# Patient Record
Sex: FEMALE | Race: WHITE | NOT HISPANIC OR LATINO | URBAN - METROPOLITAN AREA
[De-identification: names, ages, dates, MRNs, and addresses within clinical notes are randomized per-mention and may not be internally consistent; named-entity substitution may affect disease eponyms.]

---

## 2019-10-21 ENCOUNTER — WALK IN (OUTPATIENT)
Dept: URGENT CARE | Age: 24
End: 2019-10-21

## 2019-10-21 DIAGNOSIS — Z23 ENCOUNTER FOR IMMUNIZATION: Primary | ICD-10-CM

## 2019-10-21 PROCEDURE — 90471 IMMUNIZATION ADMIN: CPT | Performed by: NURSE PRACTITIONER

## 2019-10-21 PROCEDURE — 90686 IIV4 VACC NO PRSV 0.5 ML IM: CPT | Performed by: NURSE PRACTITIONER

## 2021-08-21 ENCOUNTER — TELEPHONE (OUTPATIENT)
Dept: INTERNAL MEDICINE CLINIC | Facility: HOSPITAL | Age: 26
End: 2021-08-21

## 2021-08-21 DIAGNOSIS — Z20.822 CLOSE EXPOSURE TO COVID-19 VIRUS: Primary | ICD-10-CM

## 2021-08-21 NOTE — TELEPHONE ENCOUNTER
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    Dept Manager/Supervisor/team or clinical lead: Marylee Sea    Position:  [] MD     [] RN     [] Respiratory Therapist     [] PCT     [] Other RN resident    HAVE next week. When was the last shift you worked?  8/20/21  When are you next scheduled to work? 8.22.21- night    Did you have close contact with someone on your unit while not wearing a mask? (e.g., during meal breaks):  Yes [x]   No []    If yes, who:   Do work and continue to monitor symptoms for the                                       next 14 days. Test w/ [facility] 3-5 days after exposure.                                                  COVID-19 testing ordered: [] Rapid    [x] [facility]    Date test is to

## 2021-08-22 ENCOUNTER — NURSE ONLY (OUTPATIENT)
Dept: LAB | Facility: HOSPITAL | Age: 26
End: 2021-08-22
Attending: PREVENTIVE MEDICINE

## 2021-08-22 DIAGNOSIS — Z20.822 CLOSE EXPOSURE TO COVID-19 VIRUS: ICD-10-CM

## 2021-08-25 LAB — SARS-COV-2 RNA RESP QL NAA+PROBE: NOT DETECTED

## 2021-08-25 NOTE — TELEPHONE ENCOUNTER
Results and RTW guidelines:    COVID RESULT:    [x] Viewed by employee in [facility]. RTW plan and instructions as indicated on triage call. Manager notified.   Estimated RTW date: continues to work  [] Discussed with employee   [] Unable to reach by phone

## 2022-11-07 ENCOUNTER — IMMUNIZATION (OUTPATIENT)
Dept: LAB | Facility: HOSPITAL | Age: 27
End: 2022-11-07
Attending: PREVENTIVE MEDICINE

## 2022-11-07 DIAGNOSIS — Z23 NEED FOR VACCINATION: Primary | ICD-10-CM

## 2022-11-07 PROCEDURE — 90471 IMMUNIZATION ADMIN: CPT

## 2022-12-05 ENCOUNTER — TELEPHONE (OUTPATIENT)
Dept: INTERNAL MEDICINE CLINIC | Facility: HOSPITAL | Age: 27
End: 2022-12-05

## 2022-12-05 ENCOUNTER — LAB ENCOUNTER (OUTPATIENT)
Dept: LAB | Age: 27
End: 2022-12-05
Attending: PREVENTIVE MEDICINE

## 2022-12-05 DIAGNOSIS — Z20.822 SUSPECTED COVID-19 VIRUS INFECTION: ICD-10-CM

## 2022-12-05 DIAGNOSIS — Z20.822 SUSPECTED COVID-19 VIRUS INFECTION: Primary | ICD-10-CM

## 2022-12-05 LAB — SARS-COV-2 RNA RESP QL NAA+PROBE: DETECTED

## 2022-12-06 NOTE — TELEPHONE ENCOUNTER
Results and RTW guidelines:    COVID RESULT:    [x] Viewed by employee in [address] RTW plan and instructions as indicated on triage call. Manager notified. Estimated RTW date: 12/9  [x] Discussed with employee   [] Unable to reach by phone. Sent via [employer] message      Test type:    [x] Rapid         [] [facility]         [] Outside test:       [x] Positive     - Employee should quarantine at home for at least 5 days (day 1 is day after sx onset) , follow the CDC guidelines for cleaning and                              quarantining; see CDC.gov   -This employee may RTW on day 6 if asymptomatic or mildly symptomatic (with improving symptoms). Call Employee Health on day 5 if unable to return on day 6 after                      symptom onset.    -This employee needs to call Employee Health on day 5 after symptom onset. The employee needs to be cleared by Employee Health. - Monitor symptoms and temperature                 - Notify PCP of result                 - Seek emergent care with worsening symptoms   - If employee is still experiencing severe symptoms on day 5 must make a RTW appt with Employee [facility], Employee will not be cleared if:    1. Has consistent cough, shortness of breath or fatigue that restricts your physical activities    2. Is still feeling \"unwell\"    3. Within 15 days of hospitalization for COVID    4. Within 20 days of intubation for COVID    5.  Still has a fever, vomiting or diarrhea   - Keep communication open with management about RTW and if symptoms worsen                - If outside testing completed, bring a copy of result to RTW appointment           Notes:     RTW PLAN:    [x]  If COVID positive results, off work minimum of 5 days from positive test or onset of symptoms (day 0)        On day 5, if asymptomatic or mildly symptomatic (with improving symptoms) may return to work day 6          On day 5, if symptomatic, call Employee Health for RTW screening        []  COVID positive result - call Employee Health on day 5 after symptom onset. The employee needs to be cleared by Employee Health to RTW. [] RTW immediately, continue to monitor for sx  [] RTW when sx improve; must be fever free for 24 hours w/o medications, Diarrhea/Vomiting free for 24 hours w/o medications  [] [facility] ordered; continue to monitor sx and call for new/worsening sx.   Discuss RTW guidelines with manager  [] May continue to work  [] Follow up with PCP  [] Home until further instruction from hotline with [facility] results  INSTRUCTIONS PROVIDED:  [x]  Plan as noted above  []  Length of time to obtain results   [x]  Quarantine instructions  [x]  Masking protocol   [x]  S/S of worsening infection/condition and importance of prompt medical re-evaluation including when to seek emergency care  [] If symptoms develop, stay home and call hotline for rapid test order    Estimated RTW date:  12/9    [x] The employee voiced understanding of above plan/instructions  [x] Manager Notified

## 2023-05-16 ENCOUNTER — OFFICE VISIT (OUTPATIENT)
Dept: OBGYN CLINIC | Facility: CLINIC | Age: 28
End: 2023-05-16

## 2023-05-16 VITALS — HEIGHT: 65.75 IN | WEIGHT: 146.25 LBS | BODY MASS INDEX: 23.79 KG/M2

## 2023-05-16 DIAGNOSIS — Z11.3 SCREEN FOR STD (SEXUALLY TRANSMITTED DISEASE): ICD-10-CM

## 2023-05-16 DIAGNOSIS — Z01.419 ENCOUNTER FOR WELL WOMAN EXAM WITH ROUTINE GYNECOLOGICAL EXAM: Primary | ICD-10-CM

## 2023-05-16 PROCEDURE — 99385 PREV VISIT NEW AGE 18-39: CPT | Performed by: OBSTETRICS & GYNECOLOGY

## 2023-05-16 PROCEDURE — 3008F BODY MASS INDEX DOCD: CPT | Performed by: OBSTETRICS & GYNECOLOGY

## 2023-05-16 RX ORDER — DROSPIRENONE AND ETHINYL ESTRADIOL 0.02-3(28)
1 KIT ORAL DAILY
COMMUNITY
Start: 2023-01-17 | End: 2023-05-16

## 2023-05-16 RX ORDER — DROSPIRENONE AND ETHINYL ESTRADIOL 0.02-3(28)
1 KIT ORAL DAILY
Qty: 84 TABLET | Refills: 4 | Status: SHIPPED | OUTPATIENT
Start: 2023-05-16

## 2023-05-17 LAB
C TRACH DNA SPEC QL NAA+PROBE: NEGATIVE
N GONORRHOEA DNA SPEC QL NAA+PROBE: NEGATIVE

## 2023-05-22 LAB — HPV I/H RISK 1 DNA SPEC QL NAA+PROBE: NEGATIVE

## 2023-07-04 ENCOUNTER — OFFICE VISIT (OUTPATIENT)
Dept: FAMILY MEDICINE CLINIC | Facility: CLINIC | Age: 28
End: 2023-07-04
Payer: COMMERCIAL

## 2023-07-04 VITALS
BODY MASS INDEX: 21.25 KG/M2 | OXYGEN SATURATION: 97 % | HEART RATE: 87 BPM | TEMPERATURE: 98 F | WEIGHT: 140.19 LBS | SYSTOLIC BLOOD PRESSURE: 120 MMHG | RESPIRATION RATE: 20 BRPM | DIASTOLIC BLOOD PRESSURE: 70 MMHG | HEIGHT: 68 IN

## 2023-07-04 DIAGNOSIS — R30.0 DYSURIA: Primary | ICD-10-CM

## 2023-07-04 PROCEDURE — 3074F SYST BP LT 130 MM HG: CPT | Performed by: NURSE PRACTITIONER

## 2023-07-04 PROCEDURE — 87086 URINE CULTURE/COLONY COUNT: CPT | Performed by: NURSE PRACTITIONER

## 2023-07-04 PROCEDURE — 3008F BODY MASS INDEX DOCD: CPT | Performed by: NURSE PRACTITIONER

## 2023-07-04 PROCEDURE — 87088 URINE BACTERIA CULTURE: CPT | Performed by: NURSE PRACTITIONER

## 2023-07-04 PROCEDURE — 87186 SC STD MICRODIL/AGAR DIL: CPT | Performed by: NURSE PRACTITIONER

## 2023-07-04 PROCEDURE — 3078F DIAST BP <80 MM HG: CPT | Performed by: NURSE PRACTITIONER

## 2023-07-04 PROCEDURE — 99213 OFFICE O/P EST LOW 20 MIN: CPT | Performed by: NURSE PRACTITIONER

## 2023-07-04 RX ORDER — NITROFURANTOIN 25; 75 MG/1; MG/1
100 CAPSULE ORAL 2 TIMES DAILY
Qty: 10 CAPSULE | Refills: 0 | Status: SHIPPED | OUTPATIENT
Start: 2023-07-04 | End: 2023-07-09

## 2023-10-11 ENCOUNTER — OFFICE VISIT (OUTPATIENT)
Dept: FAMILY MEDICINE CLINIC | Facility: CLINIC | Age: 28
End: 2023-10-11
Payer: COMMERCIAL

## 2023-10-11 VITALS
BODY MASS INDEX: 21.22 KG/M2 | HEART RATE: 79 BPM | DIASTOLIC BLOOD PRESSURE: 70 MMHG | RESPIRATION RATE: 16 BRPM | TEMPERATURE: 98 F | WEIGHT: 140 LBS | OXYGEN SATURATION: 97 % | HEIGHT: 68 IN | SYSTOLIC BLOOD PRESSURE: 102 MMHG

## 2023-10-11 DIAGNOSIS — R39.9 URINARY SYMPTOM OR SIGN: Primary | ICD-10-CM

## 2023-10-11 PROCEDURE — 3074F SYST BP LT 130 MM HG: CPT | Performed by: NURSE PRACTITIONER

## 2023-10-11 PROCEDURE — 3008F BODY MASS INDEX DOCD: CPT | Performed by: NURSE PRACTITIONER

## 2023-10-11 PROCEDURE — 99213 OFFICE O/P EST LOW 20 MIN: CPT | Performed by: NURSE PRACTITIONER

## 2023-10-11 PROCEDURE — 87086 URINE CULTURE/COLONY COUNT: CPT | Performed by: NURSE PRACTITIONER

## 2023-10-11 PROCEDURE — 3078F DIAST BP <80 MM HG: CPT | Performed by: NURSE PRACTITIONER

## 2023-10-11 RX ORDER — NITROFURANTOIN 25; 75 MG/1; MG/1
CAPSULE ORAL
Qty: 14 CAPSULE | Refills: 0 | Status: SHIPPED | OUTPATIENT
Start: 2023-10-11

## 2024-01-10 ENCOUNTER — OFFICE VISIT (OUTPATIENT)
Dept: FAMILY MEDICINE CLINIC | Facility: CLINIC | Age: 29
End: 2024-01-10
Payer: COMMERCIAL

## 2024-01-10 VITALS
WEIGHT: 140 LBS | OXYGEN SATURATION: 97 % | DIASTOLIC BLOOD PRESSURE: 62 MMHG | SYSTOLIC BLOOD PRESSURE: 100 MMHG | TEMPERATURE: 98 F | BODY MASS INDEX: 21.22 KG/M2 | HEART RATE: 90 BPM | RESPIRATION RATE: 16 BRPM | HEIGHT: 68 IN

## 2024-01-10 DIAGNOSIS — H69.93 ACUTE DYSFUNCTION OF EUSTACHIAN TUBE, BILATERAL: Primary | ICD-10-CM

## 2024-01-10 PROCEDURE — 3008F BODY MASS INDEX DOCD: CPT | Performed by: NURSE PRACTITIONER

## 2024-01-10 PROCEDURE — 3074F SYST BP LT 130 MM HG: CPT | Performed by: NURSE PRACTITIONER

## 2024-01-10 PROCEDURE — 99213 OFFICE O/P EST LOW 20 MIN: CPT | Performed by: NURSE PRACTITIONER

## 2024-01-10 PROCEDURE — 3078F DIAST BP <80 MM HG: CPT | Performed by: NURSE PRACTITIONER

## 2024-01-10 RX ORDER — METHYLPREDNISOLONE 4 MG/1
TABLET ORAL
Qty: 1 EACH | Refills: 0 | Status: SHIPPED | OUTPATIENT
Start: 2024-01-10

## 2024-01-10 NOTE — PROGRESS NOTES
CHIEF COMPLAINT:     Chief Complaint   Patient presents with    Ear Pain     R ear pain, similar sx to L ear   Sx onset 12/28          HPI:   Doris Moore is a 28 year old female who presents to clinic today with complaints of bilateral (R>L) ear pain. Has had symptoms waxing and waning since 12/28/23.  Was diagnosed with sinus infection by a different urgent care at that time.  Was prescribed doxy, flonase, antihistamine, sinus rinses.  The sinus symptoms improved with the meds but ear symptoms persistent.  C/o bilateral dull ear ache, feels feel plugged and mildly decreased hearing, R>L.  Symptoms worse at night.  Patient denies history of ear infections.  Nothing really makes ear pain better.  Home treatments include sinus rinses, mucinex day/night right now without relief.     Associated symptoms:  Patient denies ear drainage. Patient reports some persistent post nasal drainage.  Pt denies fever or dizziness.    Current Outpatient Medications   Medication Sig Dispense Refill           Drospirenone-Ethinyl Estradiol 3-0.02 MG Oral Tab Take 1 tablet by mouth daily. 84 tablet 4      Past Medical History:   Diagnosis Date    Osteomyelitis (HCC) 2005    IV antiboiotics      Social History:  Social History     Socioeconomic History    Marital status: Single   Tobacco Use    Smoking status: Never   Substance and Sexual Activity    Alcohol use: No    Drug use: No        REVIEW OF SYSTEMS:   GENERAL: Feeling well otherwise.    SKIN: no unusual skin lesions or rashes  HEENT: See HPI  LUNGS: No cough, shortness of breath, or wheezing.  CARDIOVASCULAR: No chest pain, palpitations  GI: No N/V/C/D.  NEURO: denies headaches or dizziness    EXAM:   /62   Pulse 90   Temp 98.3 °F (36.8 °C)   Resp 16   Ht 5' 8\" (1.727 m)   Wt 140 lb (63.5 kg)   LMP 09/25/2023 (Exact Date)   SpO2 97%   BMI 21.29 kg/m²   GENERAL: well developed, well nourished, and in no apparent distress  SKIN: no rashes, no suspicious  lesions  HEAD: atraumatic, normocephalic  EYES: conjunctiva clear, EOM intact  EARS: Tragus non tender on palpation bilaterally. External auditory canals healthy. Right TM: +Dull but non-erythematous, + bulging, no retraction,+cloudy effusion, bony landmarks mostly visible.  Left TM: +Dull but non-erythematous, +mild bulging, no retraction,+small effusion, bony landmarks visible.  NOSE: nostrils patent, no exudates, nasal mucosa pink and noninflamed  THROAT: oral mucosa pink, moist. +Posterior pharynx is not erythematous, +PND. No exudates.  NECK: supple, non-tender  LUNGS: clear to auscultation bilaterally, no wheezes or rhonchi. Breathing is non labored. No cough.  CARDIO: RRR without murmur  LYMPH: No lymphadenopathy.      ASSESSMENT AND PLAN:   Doris Moore is a 28 year old female who presents with:    ASSESSMENT:  Encounter Diagnosis   Name Primary?    Acute dysfunction of Eustachian tube, bilateral Yes       PLAN:   - Trial of consistent Flonase, Sudafed.  - If still no improvement, add Medrol pack as below.  - Comfort measures as described in Patient Instructions including tylenol/motrin prn pain.  - Elevate HOB at night.  - Advised F/U visit if no improvement/worsening/new symptoms such as fever or ear drainage within 3-5 days.  - Patient verbalizes understanding and is agreeable w/ plan.    Meds & Refills for this Visit:  Requested Prescriptions     Signed Prescriptions Disp Refills    methylPREDNISolone (MEDROL) 4 MG Oral Tablet Therapy Pack 1 each 0     Sig: As directed.         Risk and benefits of medication discussed. Pt verbalizes understanding.    There are no Patient Instructions on file for this visit.

## 2024-01-24 ENCOUNTER — HOSPITAL ENCOUNTER (OUTPATIENT)
Age: 29
Discharge: HOME | End: 2024-01-24
Payer: COMMERCIAL

## 2024-01-24 VITALS
HEART RATE: 100 BPM | RESPIRATION RATE: 18 BRPM | DIASTOLIC BLOOD PRESSURE: 84 MMHG | TEMPERATURE: 98 F | SYSTOLIC BLOOD PRESSURE: 130 MMHG | OXYGEN SATURATION: 99 %

## 2024-01-24 DIAGNOSIS — H92.01 RIGHT EAR PAIN: Primary | ICD-10-CM

## 2024-01-24 DIAGNOSIS — H69.91 DYSFUNCTION OF RIGHT EUSTACHIAN TUBE: ICD-10-CM

## 2024-01-24 PROCEDURE — 99213 OFFICE O/P EST LOW 20 MIN: CPT | Performed by: NURSE PRACTITIONER

## 2024-01-24 NOTE — ED PROVIDER NOTES
Patient Seen in: [facility]      History     Chief Complaint   Patient presents with    Ear Problem     Right ear pain that has been ongoing for 1 month - Entered by patient     Stated Complaint: Ear Problem - Right ear pain that has been ongoing for 1 month    Subjective:   HPI    28-year-old female presents to immediate care with complaints of right ear pain.  Patient states this has been going on for about 1 month.   During that time she was prescribed a course of antibiotics which she completed.  She was then prescribed a course of prednisone which she completed.  She reports brief window of relief but she is back today with increased pain in her right ear.    Objective:   Past Medical History:   Diagnosis Date    Osteomyelitis (HCC) 2005    IV antiboiotics              History reviewed. No pertinent surgical history.             Social History     Socioeconomic History    Marital status: Single   Tobacco Use    Smoking status: Never   Substance and Sexual Activity    Alcohol use: No    Drug use: No              Review of Systems    Positive for stated complaint: Ear Problem - Right ear pain that has been ongoing for 1 month  Other systems are as noted in HPI.  Constitutional and vital signs reviewed.      All other systems reviewed and negative except as noted above.    Physical Exam     ED Triage Vitals [01/24/24 1444]   /84   Pulse 100   Resp 18   Temp 98.2 °F (36.8 °C)   Temp src Temporal   SpO2 99 %   O2 Device None (Room air)       Current:/84   Pulse 100   Temp 98.2 °F (36.8 °C) (Temporal)   Resp 18   LMP 01/16/2024 (Exact Date)   SpO2 99%         Physical Exam  Vitals reviewed.   Constitutional:       General: She is not in acute distress.     Appearance: Normal appearance. She is not ill-appearing.   HENT:      Left Ear: Tympanic membrane, ear canal and external ear normal.      Ears:      Comments: + serous fluid behind R TM     Nose: Nose normal.      Mouth/Throat:       Mouth: Mucous membranes are moist.   Cardiovascular:      Rate and Rhythm: Regular rhythm. Tachycardia present.   Pulmonary:      Effort: Pulmonary effort is normal.      Breath sounds: Normal breath sounds.   Musculoskeletal:         General: Normal range of motion.      Cervical back: Normal range of motion and neck supple.   Lymphadenopathy:      Cervical: No cervical adenopathy.   Skin:     General: Skin is warm and dry.   Neurological:      General: No focal deficit present.      Mental Status: She is alert and oriented to person, place, and time.   Psychiatric:         Mood and Affect: Mood normal.               ED Course   Labs Reviewed - No data to display                   MDM                                         Medical Decision Making  28-year-old female presents to immediate care with complaints of right ear pain.  Patient states this has been going on for about 1 month.   During that time she was prescribed a course of antibiotics which she completed.  She was then prescribed a course of prednisone which she completed.  She reports brief window of relief but she is back today with increased pain in her right ear.  Patient denies fever.  Differential diagnosis includes otitis media, otitis externa, eustachian tube dysfunction.  On exam there is serous fluid behind the right TM.  There is no erythema.  Left ear is unremarkable.  Patient has taken a course of antibiotics as well as a course of prednisone.  At this point I would not retreat with those medications.  She was given the name of an ENT doctor to follow-up with.  She was given some information about eustachian tube dysfunction.      Risk  OTC drugs.        Disposition and Plan     Clinical Impression:  1. Right ear pain    2. Dysfunction of right eustachian tube         Disposition:  Discharge  1/24/2024  2:52 pm    Follow-up:  Antelmo hWitfield MD  [address] [place] [address]  [address] [place] [id number]  [phone]    In 1  day            Medications Prescribed:  Discharge Medication List as of 1/24/2024  2:52 PM

## 2024-01-24 NOTE — ED INITIAL ASSESSMENT (HPI)
Pt reports since 12/26 sinus infection and right ear pain. Finished doxy with no relief. Went to walk in clinic beginning of January, finished prednisone one week ago, but still has ear pain and congestion and \"waterfall coming out of my ear.\" + dizziness and nausea feeling yesterday that lasted about 20 minutes.

## 2024-01-26 ENCOUNTER — OFFICE VISIT (OUTPATIENT)
Dept: OTOLARYNGOLOGY | Facility: CLINIC | Age: 29
End: 2024-01-26

## 2024-01-26 DIAGNOSIS — H92.01 RIGHT EAR PAIN: Primary | ICD-10-CM

## 2024-01-26 PROCEDURE — 92504 EAR MICROSCOPY EXAMINATION: CPT | Performed by: STUDENT IN AN ORGANIZED HEALTH CARE EDUCATION/TRAINING PROGRAM

## 2024-01-26 PROCEDURE — 99203 OFFICE O/P NEW LOW 30 MIN: CPT | Performed by: STUDENT IN AN ORGANIZED HEALTH CARE EDUCATION/TRAINING PROGRAM

## 2024-01-26 RX ORDER — PREDNISONE 20 MG/1
40 TABLET ORAL DAILY
Qty: 6 TABLET | Refills: 0 | Status: SHIPPED | OUTPATIENT
Start: 2024-01-26 | End: 2024-01-26

## 2024-01-26 RX ORDER — PREDNISONE 20 MG/1
40 TABLET ORAL DAILY
Qty: 6 TABLET | Refills: 0 | Status: SHIPPED | OUTPATIENT
Start: 2024-01-26 | End: 2024-01-29

## 2024-01-26 RX ORDER — MELOXICAM 15 MG/1
15 TABLET ORAL NIGHTLY
Qty: 21 TABLET | Refills: 0 | Status: SHIPPED | OUTPATIENT
Start: 2024-01-26 | End: 2024-02-16

## 2024-01-26 RX ORDER — MELOXICAM 15 MG/1
15 TABLET ORAL NIGHTLY
Qty: 21 TABLET | Refills: 0 | Status: SHIPPED | OUTPATIENT
Start: 2024-01-26 | End: 2024-01-26

## 2024-01-26 NOTE — PROGRESS NOTES
Doris Moore is a 28 year old female.   Chief Complaint   Patient presents with    Ear Problem     C/o right ear pain X 1 month.   Vertigo and nausea        ASSESSMENT AND PLAN:   1. Right ear pain  28-year-old presents with right ear pain.  Has been for about 1 to 2 months now.  Is recently developed the vertigo and nausea.  No hearing loss    On exam normal otologic exam no obvious effusion or otitis.  Appears to have molars that are ground down.    May be grinding her teeth in her sleep or molars are ground down may want to check this with a dentist.  Will try an anti-inflammatory and 3 days of prednisone if this may represent a musculoskeletal issue.  If not improved should follow-up at the [facility] for a tympanogram to assess her eustachian tube further.  Is currently taking Flonase and Sudafed    Consult from Dr. Lundberg regarding ear evaluation    The patient indicates understanding of these issues and agrees to the plan.      EXAM:   LMP 01/16/2024 (Exact Date)     Pertinent exam findings may also be noted above in assessment and plan     System Details   Skin Inspection - Normal.   Constitutional Overall appearance - Normal.   Head/Face Symmetric, TMJ tenderness not present    Eyes EOMI, PERRL   Right ear:  Canal clear, TM intact, no AMPARO   Left ear:  Canal clear, TM intact, no AMPARO   Nose: Septum midline, inferior turbinates not enlarged, nasal valves without collapse    Oral cavity/Oropharynx: No lesions or masses on inspection or palpation, tonsils symmetric    Neck: Soft without LAD, thyroid not enlarged  Voice clear/ no stridor   Other:      Scopes and Procedures:             Current Outpatient Medications   Medication Sig Dispense Refill    Meloxicam 15 MG Oral Tab Take 1 tablet (15 mg total) by mouth at bedtime for 21 days. 21 tablet 0    predniSONE 20 MG Oral Tab Take 2 tablets (40 mg total) by mouth daily for 3 days. 6 tablet 0    methylPREDNISolone (MEDROL) 4 MG Oral Tablet  Therapy Pack As directed. 1 each 0    Drospirenone-Ethinyl Estradiol 3-0.02 MG Oral Tab Take 1 tablet by mouth daily. 84 tablet 4      Past Medical History:   Diagnosis Date    Osteomyelitis (HCC) 2005    IV antiboiotics      Social History:  Social History     Socioeconomic History    Marital status: Single   Tobacco Use    Smoking status: Never   Substance and Sexual Activity    Alcohol use: No    Drug use: No          Moses Myers MD  1/26/2024  2:04 PM

## 2024-07-02 ENCOUNTER — OFFICE VISIT (OUTPATIENT)
Dept: FAMILY MEDICINE CLINIC | Facility: CLINIC | Age: 29
End: 2024-07-02
Payer: COMMERCIAL

## 2024-07-02 VITALS
DIASTOLIC BLOOD PRESSURE: 76 MMHG | TEMPERATURE: 97 F | WEIGHT: 130 LBS | HEIGHT: 68 IN | OXYGEN SATURATION: 96 % | SYSTOLIC BLOOD PRESSURE: 134 MMHG | HEART RATE: 112 BPM | RESPIRATION RATE: 16 BRPM | BODY MASS INDEX: 19.7 KG/M2

## 2024-07-02 DIAGNOSIS — Z11.3 SCREENING EXAMINATION FOR STI: Primary | ICD-10-CM

## 2024-07-02 PROCEDURE — 87491 CHLMYD TRACH DNA AMP PROBE: CPT | Performed by: NURSE PRACTITIONER

## 2024-07-02 PROCEDURE — 87591 N.GONORRHOEAE DNA AMP PROB: CPT | Performed by: NURSE PRACTITIONER

## 2024-07-02 PROCEDURE — 99213 OFFICE O/P EST LOW 20 MIN: CPT | Performed by: NURSE PRACTITIONER

## 2024-07-02 NOTE — PROGRESS NOTES
CHIEF COMPLAINT:     Chief Complaint   Patient presents with    Other     STI screening          HPI:   Doris Moore is a 29 year old female who presents for STI testing.  No symptoms.  Denies urinary symptoms, abd pain, flank pain, fever, hematuria, nausea, vomiting, vaginal discharge, itching, lesions, or rash.   1 new sexual partner in the last 3 months.  Recent unprotected sexual intercourse.  Partner was unfaithful.   Treatments tried: none.   Other  hx: denies  No hx of STI, pyelonephritis, or kidney stone.     Current Outpatient Medications   Medication Sig Dispense Refill    Drospirenone-Ethinyl Estradiol 3-0.02 MG Oral Tab Take 1 tablet by mouth daily. 84 tablet 4      Past Medical History:    Osteomyelitis (HCC)    IV antiboiotics      Social History:  Social History     Socioeconomic History    Marital status: Single   Tobacco Use    Smoking status: Never   Substance and Sexual Activity    Alcohol use: No    Drug use: No         REVIEW OF SYSTEMS:   GENERAL: Denies fever, chills, or body aches; Good appetite  SKIN: no rashes  CARDIOVASCULAR: denies chest pain or palpitations  LUNGS: denies shortness of breath, cough, or wheezing  GI: No N/V/C/D.   : No urinary frequency, urgency, dysuria       EXAM:   /76   Pulse 112   Temp 97.4 °F (36.3 °C)   Resp 16   Ht 5' 8\" (1.727 m)   Wt 130 lb (59 kg)   LMP 07/01/2024 (Exact Date)   SpO2 96%   BMI 19.77 kg/m²   GENERAL: well developed, well nourished,in no apparent distress, crying and upset.    CARDIO: RRR, no murmurs  LUNGS: clear to ausculation bilaterally, no wheezing or rhonchi  GI: BS present x 4.  No hepatosplenomegaly, No tenderness  : No suprapubic tenderness; no bladder distention; No CVAT   EXTREMITIES: no edema        ASSESSMENT AND PLAN:   Doris Moore is a 29 year old female presents with UTI symptoms.    ASSESSMENT:  Encounter Diagnosis   Name Primary?    Screening examination for STI Yes       PLAN:   Chlamydia  and gonorrhea sent to lab  Avoid intercourse until negative results  Will treat based on results  No known STI exposure.     The patient is to f/u with PCP if wants further testing.     To seek immediate care for fever, vomiting, flank pain, or worsening symptoms.    Meds & Refills for this Visit:  Requested Prescriptions      No prescriptions requested or ordered in this encounter         Patient Instructions   -avoid intercourse until results  -Use condoms  -Chlamydia and Gonorrhea Culture sent to lab   -We will call if positive results.                   The patient indicates understanding of these issues and agrees to the plan.

## 2024-07-02 NOTE — PATIENT INSTRUCTIONS
-avoid intercourse until results  -Use condoms  -Chlamydia and Gonorrhea Culture sent to lab   -We will call if positive results.

## 2024-07-03 LAB
C TRACH DNA SPEC QL NAA+PROBE: NEGATIVE
N GONORRHOEA DNA SPEC QL NAA+PROBE: NEGATIVE

## 2024-07-10 RX ORDER — DROSPIRENONE AND ETHINYL ESTRADIOL 0.02-3(28)
1 KIT ORAL DAILY
Qty: 84 TABLET | Refills: 1 | Status: SHIPPED | OUTPATIENT
Start: 2024-07-10

## 2024-09-10 ENCOUNTER — OFFICE VISIT (OUTPATIENT)
Dept: OBGYN CLINIC | Facility: CLINIC | Age: 29
End: 2024-09-10

## 2024-09-10 VITALS
SYSTOLIC BLOOD PRESSURE: 116 MMHG | DIASTOLIC BLOOD PRESSURE: 78 MMHG | BODY MASS INDEX: 21.8 KG/M2 | WEIGHT: 138.88 LBS | HEIGHT: 67 IN

## 2024-09-10 DIAGNOSIS — Z12.4 ENCOUNTER FOR PAPANICOLAOU SMEAR FOR CERVICAL CANCER SCREENING: ICD-10-CM

## 2024-09-10 DIAGNOSIS — Z11.3 SCREENING EXAMINATION FOR STD (SEXUALLY TRANSMITTED DISEASE): ICD-10-CM

## 2024-09-10 DIAGNOSIS — N89.8 VAGINAL DISCHARGE: ICD-10-CM

## 2024-09-10 DIAGNOSIS — Z01.419 ENCOUNTER FOR WELL WOMAN EXAM WITH ROUTINE GYNECOLOGICAL EXAM: Primary | ICD-10-CM

## 2024-09-10 PROCEDURE — 99395 PREV VISIT EST AGE 18-39: CPT | Performed by: OBSTETRICS & GYNECOLOGY

## 2024-09-11 LAB
C TRACH DNA SPEC QL NAA+PROBE: NEGATIVE
N GONORRHOEA DNA SPEC QL NAA+PROBE: NEGATIVE

## 2024-09-14 LAB
.: NORMAL
.: NORMAL

## 2024-09-29 ENCOUNTER — OFFICE VISIT (OUTPATIENT)
Dept: FAMILY MEDICINE CLINIC | Facility: CLINIC | Age: 29
End: 2024-09-29
Payer: COMMERCIAL

## 2024-09-29 VITALS
HEART RATE: 75 BPM | WEIGHT: 138 LBS | BODY MASS INDEX: 21.66 KG/M2 | SYSTOLIC BLOOD PRESSURE: 98 MMHG | TEMPERATURE: 98 F | OXYGEN SATURATION: 97 % | HEIGHT: 67 IN | DIASTOLIC BLOOD PRESSURE: 64 MMHG | RESPIRATION RATE: 18 BRPM

## 2024-09-29 DIAGNOSIS — R39.9 UTI SYMPTOMS: ICD-10-CM

## 2024-09-29 DIAGNOSIS — N30.91 CYSTITIS WITH HEMATURIA: Primary | ICD-10-CM

## 2024-09-29 LAB
APPEARANCE: CLEAR
BILIRUBIN: NEGATIVE
GLUCOSE (URINE DIPSTICK): NEGATIVE MG/DL
KETONES (URINE DIPSTICK): NEGATIVE MG/DL
MULTISTIX LOT#: ABNORMAL NUMERIC
NITRITE, URINE: POSITIVE
PH, URINE: 7 (ref 4.5–8)
PROTEIN (URINE DIPSTICK): NEGATIVE MG/DL
SPECIFIC GRAVITY: 1.01 (ref 1–1.03)
URINE-COLOR: YELLOW
UROBILINOGEN,SEMI-QN: 0.2 MG/DL (ref 0–1.9)

## 2024-09-29 PROCEDURE — 99213 OFFICE O/P EST LOW 20 MIN: CPT | Performed by: NURSE PRACTITIONER

## 2024-09-29 PROCEDURE — 87186 SC STD MICRODIL/AGAR DIL: CPT | Performed by: NURSE PRACTITIONER

## 2024-09-29 PROCEDURE — 87088 URINE BACTERIA CULTURE: CPT | Performed by: NURSE PRACTITIONER

## 2024-09-29 PROCEDURE — 87086 URINE CULTURE/COLONY COUNT: CPT | Performed by: NURSE PRACTITIONER

## 2024-09-29 PROCEDURE — 81003 URINALYSIS AUTO W/O SCOPE: CPT | Performed by: NURSE PRACTITIONER

## 2024-09-29 RX ORDER — NITROFURANTOIN 25; 75 MG/1; MG/1
100 CAPSULE ORAL 2 TIMES DAILY
Qty: 10 CAPSULE | Refills: 0 | Status: SHIPPED | OUTPATIENT
Start: 2024-09-29 | End: 2024-10-04

## 2024-09-29 NOTE — PROGRESS NOTES
CHIEF COMPLAINT:     Chief Complaint   Patient presents with    Urinary Symptoms     Symptoms started yesterday : Burning sensation, urgency, and discomfort.  OTC: [facility] at 2am   No concerns for Std's         HPI:   oDris Moore is a 29 year old female who presents with symptoms of UTI. Complaining of urinary frequency, urgency, dysuria that began yesterday.   Denies flank pain, fever, abdominal pain, nausea, or vomiting.  Denies vaginal discharge or itching, new sexual partners in the last 3 months, or recent unprotected sexual intercourse.     Current Outpatient Medications   Medication Sig Dispense Refill    nitrofurantoin monohydrate macro (MACROBID) 100 MG Oral Cap Take 1 capsule (100 mg total) by mouth 2 (two) times daily for 5 days. 10 capsule 0    VESTURA 3-0.02 MG Oral Tab TAKE 1 TABLET BY MOUTH EVERY DAY 84 tablet 1      Past Medical History:    Human papilloma virus infection    High risk strain detected    Osteomyelitis (HCC)    IV antiboiotics      Social History:  Social History     Socioeconomic History    Marital status: Single   Tobacco Use    Smoking status: Never     Passive exposure: Never    Smokeless tobacco: Never   Vaping Use    Vaping status: Never Used   Substance and Sexual Activity    Alcohol use: Yes     Alcohol/week: 1.0 - 2.0 standard drink of alcohol     Types: 1 - 2 Glasses of wine per week    Drug use: No         REVIEW OF SYSTEMS:   GENERAL: Denies fever, chills, or body aches  SKIN: no rashes, no skin wounds or ulcers.  EYES:denies blurred vision or double vision  HEENT: no congestion, rhinorrhea, sore throat or ear pain  CARDIOVASCULAR: denies chest pain or palpitations  LUNGS: denies shortness of breath, cough, or wheezing  GI: See HPI. No N/V/C/D.   : See HPI.      EXAM:   BP 98/64   Pulse 75   Temp 97.9 °F (36.6 °C)   Resp 18   Ht 5' 7\" (1.702 m)   Wt 138 lb (62.6 kg)   LMP 09/23/2024 (Approximate)   SpO2 97%   BMI 21.61 kg/m²   GENERAL: well developed, well  nourished,in no apparent distress  CARDIO: RRR, no murmurs  LUNGS: clear to ausculation bilaterally, no wheezing or rhonchi  GI: BS present x 4.  No hepatosplenomegaly. No guarding or rebound tenderness.  : no suprapubic tenderness, bladder distention, or CVAT     Recent Results (from the past 24 hour(s))   URINALYSIS, AUTO, W/O SCOPE    Collection Time: 09/29/24  3:19 PM   Result Value Ref Range    Glucose Urine Negative Negative mg/dL    Bilirubin Urine Negative Negative    Ketones, UA Negative Negative - Trace mg/dL    Spec Gravity 1.010 1.005 - 1.030    Blood Urine Moderate (A) Negative    PH Urine 7.0 5.0 - 8.0    Protein Urine Negative Negative - Trace mg/dL    Urobilinogen Urine 0.2 0.2 - 1.0 mg/dL    Nitrite Urine Positive (A) Negative    Leukocyte Esterase Urine Large (A) Negative    APPEARANCE clear Clear    Color Urine yellow Yellow    Multistix Lot# [id number] Numeric    Multistix Expiration Date 6/30/25 Date         ASSESSMENT AND PLAN:   Doris Moore is a 29 year old female who presents with UTI symptoms. Abnormal U/A: Blood: Moderate, Nitrates: Positive, Leukocytes: Large. Will treat today due to Urine dip abnormalities and pain.     ASSESSMENT:  Encounter Diagnoses   Name Primary?    UTI symptoms     Cystitis with hematuria Yes       PLAN:  Please remember that illnesses can change quickly, and although it is not felt that your symptoms currently require further treatment at the ER if you symptoms do worsen, change or if new symptoms were to develop please seek emergent care at the nearest ER.     Complete full course of antibiotics.  Over the counter Tylenol/Motrin as needed for pain/discomfort.  Follow up in 2-3 days if symptoms do not improve or worsen.    Return to clinic or see your primary care provider immediately if you experience nausea, vomiting, or fever.   What can you do to help prevent bladder infections?   Urinate often during the day. You should also urinate after you have sex.    If you are a woman, it is important to:   Keep the area around your vagina clean.   Wipe from front to back after you go to the bathroom.   Gently wash the area around your vagina when you bathe or shower.   Wear cotton underwear.   Use pantyhose with cotton crotches.   Avoid tight clothing. Wear loose pants.   Do not wear a wet bathing suit for a long time.  The patient indicates understanding of these issues and agrees to the plan.  The patient is asked to return in 3 days if not better. Call or go to ER if fever, vomiting, worsening symptoms.

## 2024-10-22 RX ORDER — DROSPIRENONE AND ETHINYL ESTRADIOL 0.02-3(28)
1 KIT ORAL DAILY
Qty: 84 TABLET | Refills: 1 | Status: SHIPPED | OUTPATIENT
Start: 2024-10-22

## 2025-01-03 ENCOUNTER — OFFICE VISIT (OUTPATIENT)
Dept: FAMILY MEDICINE CLINIC | Facility: CLINIC | Age: 30
End: 2025-01-03
Payer: COMMERCIAL

## 2025-01-03 VITALS
RESPIRATION RATE: 18 BRPM | HEART RATE: 80 BPM | OXYGEN SATURATION: 98 % | WEIGHT: 138 LBS | TEMPERATURE: 98 F | HEIGHT: 67 IN | DIASTOLIC BLOOD PRESSURE: 82 MMHG | BODY MASS INDEX: 21.66 KG/M2 | SYSTOLIC BLOOD PRESSURE: 114 MMHG

## 2025-01-03 DIAGNOSIS — R39.9 UTI SYMPTOMS: Primary | ICD-10-CM

## 2025-01-03 PROCEDURE — 87086 URINE CULTURE/COLONY COUNT: CPT | Performed by: PHYSICIAN ASSISTANT

## 2025-01-03 PROCEDURE — 99213 OFFICE O/P EST LOW 20 MIN: CPT | Performed by: PHYSICIAN ASSISTANT

## 2025-01-03 RX ORDER — NITROFURANTOIN 25; 75 MG/1; MG/1
100 CAPSULE ORAL 2 TIMES DAILY
Qty: 14 CAPSULE | Refills: 0 | Status: SHIPPED | OUTPATIENT
Start: 2025-01-03 | End: 2025-01-10

## 2025-01-03 NOTE — PROGRESS NOTES
CHIEF COMPLAINT:     Chief Complaint   Patient presents with    Urinary Symptoms     UTI - Entered by patient  Started Sunday   Burning blood urgency        HPI:   Doris Moore is a 29 year old female who presents with symptoms of UTI. Complaining of urinary frequency, urgency, dysuria for last 5 days. Symptoms have been worsening since onset.  Treatments tried: azo.  Denies flank pain, fever, nausea, or vomiting.  Denies new vaginal complaints or concern for sti.  She has noted blood in the urine.     She has  had UTI before.  Her current symptoms are  the same.     She has no history of renal stones, pyelonephritis.      Current Outpatient Medications   Medication Sig Dispense Refill    nitrofurantoin monohydrate macro 100 MG Oral Cap Take 1 capsule (100 mg total) by mouth 2 (two) times daily for 7 days. 14 capsule 0    Drospirenone-Ethinyl Estradiol (VESTURA) 3-0.02 MG Oral Tab Take 1 tablet by mouth daily. 84 tablet 1      Past Medical History:    Human papilloma virus infection    High risk strain detected    Osteomyelitis (HCC)    IV antiboiotics      Social History:  Social History     Socioeconomic History    Marital status: Single   Tobacco Use    Smoking status: Never     Passive exposure: Never    Smokeless tobacco: Never   Vaping Use    Vaping status: Never Used   Substance and Sexual Activity    Alcohol use: Yes     Alcohol/week: 1.0 - 2.0 standard drink of alcohol     Types: 1 - 2 Glasses of wine per week    Drug use: No         REVIEW OF SYSTEMS:   GENERAL: Denies fever, chills, or body aches  SKIN: no rashes, no skin wounds or ulcers.  EYES:denies blurred vision or double vision  HEENT: no congestion, rhinorrhea, sore throat or ear pain  CARDIOVASCULAR: denies chest pain or palpitations  LUNGS: denies shortness of breath, cough, or wheezing  GI: See HPI. No N/V/C/D.   : See HPI.  NEURO: no headaches.    EXAM:   /82   Pulse 80   Temp 97.6 °F (36.4 °C)   Resp 18   Ht 5' 7\" (1.702  m)   Wt 138 lb (62.6 kg)   LMP 12/23/2024 (Exact Date)   SpO2 98%   BMI 21.61 kg/m²   GENERAL: well developed, well nourished,in no apparent distress  CARDIO: RRR, no murmurs  LUNGS: clear to ausculation bilaterally, no wheezing or rhonchi  GI: BS present x 4.  No hepatosplenomegaly.  : + suprapubic tenderness. No bladder distention, or CVAT     No results found for this or any previous visit (from the past 24 hours).      ASSESSMENT AND PLAN:   Doris Moore is a 29 year old female presents with UTI symptoms.    ASSESSMENT:  Encounter Diagnosis   Name Primary?    UTI symptoms Yes       PLAN: Meds as listed below.  Push fluids.  Comfort measures as described in Patient Instructions    UA was not run due to azo use and bright orange stained urine since results cannot be interpreted.  Urine culture sent.   Treat with Macrobid while cultures are pending.       Meds & Refills for this Visit:  Requested Prescriptions     Signed Prescriptions Disp Refills    nitrofurantoin monohydrate macro 100 MG Oral Cap 14 capsule 0     Sig: Take 1 capsule (100 mg total) by mouth 2 (two) times daily for 7 days.       Risk and benefits of medication discussed. Stressed importance of completing full course of antibiotic unless told otherwise.     There are no Patient Instructions on file for this visit.      The patient indicates understanding of these issues and agrees to the plan.    The patient is asked to follow up with PCP  if not improving.  Advised to go to ED with development of fever, vomiting, abdominal pain, gross hematuria or other worsening symptoms.